# Patient Record
Sex: MALE
[De-identification: names, ages, dates, MRNs, and addresses within clinical notes are randomized per-mention and may not be internally consistent; named-entity substitution may affect disease eponyms.]

---

## 2024-05-22 PROBLEM — Z00.00 ENCOUNTER FOR PREVENTIVE HEALTH EXAMINATION: Status: ACTIVE | Noted: 2024-05-22

## 2024-06-10 ENCOUNTER — APPOINTMENT (OUTPATIENT)
Dept: ORTHOPEDIC SURGERY | Facility: CLINIC | Age: 59
End: 2024-06-10
Payer: COMMERCIAL

## 2024-06-10 ENCOUNTER — TRANSCRIPTION ENCOUNTER (OUTPATIENT)
Age: 59
End: 2024-06-10

## 2024-06-10 VITALS — HEIGHT: 71 IN | WEIGHT: 202 LBS | BODY MASS INDEX: 28.28 KG/M2

## 2024-06-10 DIAGNOSIS — M17.12 UNILATERAL PRIMARY OSTEOARTHRITIS, LEFT KNEE: ICD-10-CM

## 2024-06-10 DIAGNOSIS — Z78.9 OTHER SPECIFIED HEALTH STATUS: ICD-10-CM

## 2024-06-10 PROCEDURE — 99203 OFFICE O/P NEW LOW 30 MIN: CPT

## 2024-06-10 PROCEDURE — 73564 X-RAY EXAM KNEE 4 OR MORE: CPT | Mod: LT

## 2024-06-10 RX ORDER — MELOXICAM 7.5 MG/1
7.5 TABLET ORAL
Qty: 30 | Refills: 0 | Status: ACTIVE | COMMUNITY
Start: 2024-06-10 | End: 1900-01-01

## 2024-06-10 NOTE — IMAGING
[de-identified] : LEFT KNEE  Inspection:  mild effusion  Palpation: medial joint line tenderness, anterior tenderness  Knee Range of Motion:  3-125   Strength: 5/5 Quadriceps strength, 5/5 Hamstring strength  Neurological: light touch is intact throughout  Ligament Stability and Special Tests:   McMurrays: neg  Lachman: neg  Pivot Shift: neg  Posterior Drawer: neg  Valgus: neg  Varus: neg  Patella Apprehension: neg  Patella Maltracking: neg

## 2024-06-10 NOTE — DATA REVIEWED
[FreeTextEntry1] :  Left X-Ray Examination of the KNEE (4 views): mild medial and patellofemoral degenerate changes.   calcific quad and patella tendinopathy

## 2024-06-10 NOTE — HISTORY OF PRESENT ILLNESS
[de-identified] : Date of Injury/Onset: January 2024 Pain: At Rest: 0 With Activity: 3 Mechanism of injury: slipped in the snow  This is NOT a Work Related Injury being treated under Worker's Compensation. This is NOT an athletic injury occurring associated with an interscholastic or organized sports team. Quality of symptoms: swelling, clicking, sharp, tingling  Improves with: ointment Worse with: bending, stretching  Prior treatment: no Prior Imaging: no Reports Available For Review Today: no Out of work/sport: working School/Sport/Position/Occupation: caregiver   06/10/2024 CLARY 59 year M is here today for evaluation of left knee. Patient reports injury on January 2024, slipped in the snow. Patient states pain began lately in May 2024. Patient had been applying ointments for pain relief.Pt complains of pain along posterolateral joint line. Worsened with activities, improved with rest. Patient denies numbness however endorses some swelling, clicking and sharp pain. Patient states pain is worse when bending and stretching his leg.    denies any prior txs or surgery no issues in knee prior to fall

## 2024-06-10 NOTE — DISCUSSION/SUMMARY
[de-identified] :   - We discussed their diagnosis and treatment options at length including the risks and benefits of both surgical treatment with a knee replacement and non-surgical options.  - We will continue conservative treatment with activity modification, PT, icing, weight loss, and anti-inflammatory medications.  - The patient was provided with a PT prescription to work on ROM, hip ER/abductors strengthening, quad/hamstring stretches and strengthening, and other exercises.  - The patient was advised to let pain guide the gradual advancement of activities.  - We discussed the possible of injections in the future.  - Follow up as needed in 6 weeks as to re-evaluate  no tenderness along patella osteophytes  next visit: consider L spine xrays as well

## 2024-08-05 ENCOUNTER — APPOINTMENT (OUTPATIENT)
Dept: ORTHOPEDIC SURGERY | Facility: CLINIC | Age: 59
End: 2024-08-05

## 2024-08-05 PROCEDURE — 99214 OFFICE O/P EST MOD 30 MIN: CPT

## 2024-08-05 NOTE — IMAGING
[de-identified] : LEFT KNEE  Inspection:  mild effusion  Palpation: medial joint line tenderness, anterior tenderness  Knee Range of Motion:  3-125   Strength: 5/5 Quadriceps strength, 5/5 Hamstring strength  Neurological: light touch is intact throughout  Ligament Stability and Special Tests:   McMurrays: neg  Lachman: neg  Pivot Shift: neg  Posterior Drawer: neg  Valgus: neg  Varus: neg  Patella Apprehension: neg  Patella Maltracking: neg

## 2024-08-05 NOTE — DISCUSSION/SUMMARY
[de-identified] :   - We discussed their diagnosis and treatment options at length including the risks and benefits of both surgical treatment with a knee replacement and non-surgical options.  - We will continue conservative treatment with activity modification, PT, icing, weight loss, and anti-inflammatory medications.  - The patient was provided with a PT prescription to work on ROM, hip ER/abductors strengthening, quad/hamstring stretches and strengthening, and other exercises.  - The patient was advised to let pain guide the gradual advancement of activities.  - We discussed the possible of injections in the future.  - Follow up as needed in 6 weeks as to re-evaluate  no tenderness along patella osteophytes  next visit: consider L spine xrays as well *** 8/5 Pt  doing well pain resolved, mild effusion, not limited with activities d/w pt possible MR if symptoms recur to rule out any concomitant pathology prior to discussing injections RTC 3 months  next visit: If no improvement consider MR v  CSI depening on pt symptoms

## 2024-08-05 NOTE — HISTORY OF PRESENT ILLNESS
[de-identified] : Date of Injury/Onset: January 2024 Pain: At Rest: 0 With Activity: 3 Mechanism of injury: slipped in the snow  This is NOT a Work Related Injury being treated under Worker's Compensation. This is NOT an athletic injury occurring associated with an interscholastic or organized sports team. Quality of symptoms: swelling, clicking, sharp, tingling  Improves with: ointment Worse with: bending, stretching  Prior treatment: no Prior Imaging: no Reports Available For Review Today: no Out of work/sport: working School/Sport/Position/Occupation: caregiver   06/10/2024 CLARY Conklin year DANIEL is here today for evaluation of left knee. Patient reports injury on January 2024, slipped in the snow. Patient states pain began lately in May 2024. Patient had been applying ointments for pain relief.Pt complains of pain along posterolateral joint line. Worsened with activities, improved with rest. Patient denies numbness however endorses some swelling, clicking and sharp pain. Patient states pain is worse when bending and stretching his leg.    denies any prior txs or surgery no issues in knee prior to fall  08/05/2024 CLARY chin M is here today to follow up on left knee. Patient is doing PT and had been taken meloxicam.  Festus reports some mild improvement since last visit, reports some swelling, states pain has resolved. Occasionally endorses some mild knee swelling after playing basketball. Happy with progres.

## 2024-09-16 ENCOUNTER — APPOINTMENT (OUTPATIENT)
Dept: ORTHOPEDIC SURGERY | Facility: CLINIC | Age: 59
End: 2024-09-16

## 2024-09-30 ENCOUNTER — APPOINTMENT (OUTPATIENT)
Dept: ORTHOPEDIC SURGERY | Facility: CLINIC | Age: 59
End: 2024-09-30

## 2024-09-30 DIAGNOSIS — S83.232A COMPLEX TEAR OF MEDIAL MENISCUS, CURRENT INJURY, LEFT KNEE, INITIAL ENCOUNTER: ICD-10-CM

## 2024-09-30 DIAGNOSIS — M17.12 UNILATERAL PRIMARY OSTEOARTHRITIS, LEFT KNEE: ICD-10-CM

## 2024-09-30 PROCEDURE — 99213 OFFICE O/P EST LOW 20 MIN: CPT | Mod: 25

## 2024-09-30 PROCEDURE — 20610 DRAIN/INJ JOINT/BURSA W/O US: CPT | Mod: LT

## 2024-09-30 NOTE — DISCUSSION/SUMMARY
[de-identified] :   - We discussed their diagnosis and treatment options at length including the risks and benefits of both surgical treatment with a knee replacement and non-surgical options.  - We will continue conservative treatment with activity modification, PT, icing, weight loss, and anti-inflammatory medications.  - The patient was provided with a PT prescription to work on ROM, hip ER/abductors strengthening, quad/hamstring stretches and strengthening, and other exercises.  - The patient was advised to let pain guide the gradual advancement of activities.  - We discussed the possible of injections in the future.  - Follow up as needed in 6 weeks as to re-evaluate  no tenderness along patella osteophytes  next visit: consider L spine xrays as well *** 8/5 Pt  doing well pain resolved, mild effusion, not limited with activities d/w pt possible MR if symptoms recur to rule out any concomitant pathology prior to discussing injections RTC 3 months  next visit: If no improvement consider MR v  CSI depening on pt symptoms **** 9/30 Pt  doing well still endorsing swelling and tightness MR suggestive of OA and effusion, questionable PHMM tear, baker cyst Rec: Mobic + PT + compression sleeve CSI RTC 3 months  next visit: If no improvement consider HA

## 2024-09-30 NOTE — PROCEDURE
[FreeTextEntry1] : Left knee CSI [FreeTextEntry2] : Left knee pain [FreeTextEntry3] : Procedure: Kenalog injection of the left Knee joint  Indication:  Inflammation and Joint pain.  Risk, benefits and alternatives were discussed with the patient. Potential complications include bleeding and infection.  Alcohol was used to prep the area.  Ethyl chloride spray was used as a topical anesthetic.  Using sterile technique, the aspiration/injection needle was then directed from a lateral and superior aspect.  The procedure was not ultrasound guided.  A 1.5 inch 21g needle was used to inject 3 mL of 1% Lidocaine, 3 mL 0.25% Bupivacaine and 1 mL 40mg/mL triamcinolone.  A bandage was applied.  The patient tolerated the procedure well.  Complications: None.  Patient instructed to avoid strenuous activity for 2 day(s).  Follow-up in the office as needed, in 3 months for repeat injection, if pain remains unresolved, or for further concerns.  Specifically counseled regarding the signs and symptoms of potential intraarticular infection and instructed to present promptly to clinic or hospital if such signs and symptoms arise.

## 2024-09-30 NOTE — DATA REVIEWED
[FreeTextEntry1] :  Left X-Ray Examination of the KNEE (4 views): mild medial and patellofemoral degenerate changes.   calcific quad and patella tendinopathy no

## 2024-09-30 NOTE — HISTORY OF PRESENT ILLNESS
[de-identified] : Date of Injury/Onset: January 2024 Pain: At Rest: 0 With Activity: 3 Mechanism of injury: slipped in the snow  This is NOT a Work Related Injury being treated under Worker's Compensation. This is NOT an athletic injury occurring associated with an interscholastic or organized sports team. 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 Patient reports injury on January 2024, slipped in the snow. Patient states pain began lately in May 2024. Patient had been applying ointments for pain relief.Pt complains of pain along posterolateral joint line. Worsened with activities, improved with rest. Patient denies numbness however endorses some swelling, clicking and sharp pain. Patient states pain is worse when bending and stretching his leg.    denies any prior txs or surgery no issues in knee prior to fall  08/05/2024 CLARY Conklin year M is here today to follow up on left knee. Patient is doing PT and had been taken meloxicam.  Patient reports some mild improvement since last visit, reports some swelling, states pain has resolved. Occasionally endorses some mild knee swelling after playing basketball. Happy with progress.   09/30/2024 CLARY  is here today to follow up on MRI of left knee. Patient reports no pain however endorses some swelling and tightness.

## 2024-09-30 NOTE — IMAGING
[de-identified] : LEFT KNEE  Inspection:  mild effusion  Palpation: medial joint line tenderness, anterior tenderness  Knee Range of Motion:  3-125   Strength: 5/5 Quadriceps strength, 5/5 Hamstring strength  Neurological: light touch is intact throughout  Ligament Stability and Special Tests:   McMurrays: neg  Lachman: neg  Pivot Shift: neg  Posterior Drawer: neg  Valgus: neg  Varus: neg  Patella Apprehension: neg  Patella Maltracking: neg

## 2024-11-25 ENCOUNTER — APPOINTMENT (OUTPATIENT)
Dept: ORTHOPEDIC SURGERY | Facility: CLINIC | Age: 59
End: 2024-11-25

## 2025-01-17 ENCOUNTER — EMERGENCY (EMERGENCY)
Facility: HOSPITAL | Age: 60
LOS: 1 days | Discharge: HOME | End: 2025-01-17
Attending: EMERGENCY MEDICINE
Payer: COMMERCIAL

## 2025-01-17 VITALS
OXYGEN SATURATION: 96 % | SYSTOLIC BLOOD PRESSURE: 116 MMHG | HEART RATE: 59 BPM | DIASTOLIC BLOOD PRESSURE: 74 MMHG | RESPIRATION RATE: 17 BRPM | TEMPERATURE: 97 F

## 2025-01-17 VITALS
HEIGHT: 71 IN | TEMPERATURE: 98 F | WEIGHT: 205.03 LBS | RESPIRATION RATE: 16 BRPM | OXYGEN SATURATION: 96 % | SYSTOLIC BLOOD PRESSURE: 151 MMHG | HEART RATE: 51 BPM | DIASTOLIC BLOOD PRESSURE: 85 MMHG

## 2025-01-17 LAB
ALBUMIN SERPL ELPH-MCNC: 3.9 G/DL — SIGNIFICANT CHANGE UP (ref 3.5–5)
ALP SERPL-CCNC: 44 U/L — SIGNIFICANT CHANGE UP (ref 40–120)
ALT FLD-CCNC: 29 U/L DA — SIGNIFICANT CHANGE UP (ref 10–60)
ANION GAP SERPL CALC-SCNC: 6 MMOL/L — SIGNIFICANT CHANGE UP (ref 5–17)
APPEARANCE UR: CLEAR — SIGNIFICANT CHANGE UP
AST SERPL-CCNC: 13 U/L — SIGNIFICANT CHANGE UP (ref 10–40)
BASOPHILS # BLD AUTO: 0.04 K/UL — SIGNIFICANT CHANGE UP (ref 0–0.2)
BASOPHILS NFR BLD AUTO: 0.7 % — SIGNIFICANT CHANGE UP (ref 0–2)
BILIRUB SERPL-MCNC: 1.3 MG/DL — HIGH (ref 0.2–1.2)
BILIRUB UR-MCNC: NEGATIVE — SIGNIFICANT CHANGE UP
BUN SERPL-MCNC: 15 MG/DL — SIGNIFICANT CHANGE UP (ref 7–18)
CALCIUM SERPL-MCNC: 9.3 MG/DL — SIGNIFICANT CHANGE UP (ref 8.4–10.5)
CHLORIDE SERPL-SCNC: 107 MMOL/L — SIGNIFICANT CHANGE UP (ref 96–108)
CO2 SERPL-SCNC: 29 MMOL/L — SIGNIFICANT CHANGE UP (ref 22–31)
COLOR SPEC: YELLOW — SIGNIFICANT CHANGE UP
CREAT SERPL-MCNC: 0.87 MG/DL — SIGNIFICANT CHANGE UP (ref 0.5–1.3)
DIFF PNL FLD: NEGATIVE — SIGNIFICANT CHANGE UP
EGFR: 101 ML/MIN/1.73M2 — SIGNIFICANT CHANGE UP
EOSINOPHIL # BLD AUTO: 0.09 K/UL — SIGNIFICANT CHANGE UP (ref 0–0.5)
EOSINOPHIL NFR BLD AUTO: 1.7 % — SIGNIFICANT CHANGE UP (ref 0–6)
GLUCOSE SERPL-MCNC: 111 MG/DL — HIGH (ref 70–99)
GLUCOSE UR QL: NEGATIVE MG/DL — SIGNIFICANT CHANGE UP
HCT VFR BLD CALC: 43.4 % — SIGNIFICANT CHANGE UP (ref 39–50)
HGB BLD-MCNC: 14.6 G/DL — SIGNIFICANT CHANGE UP (ref 13–17)
IMM GRANULOCYTES NFR BLD AUTO: 0.2 % — SIGNIFICANT CHANGE UP (ref 0–0.9)
KETONES UR-MCNC: NEGATIVE MG/DL — SIGNIFICANT CHANGE UP
LEUKOCYTE ESTERASE UR-ACNC: NEGATIVE — SIGNIFICANT CHANGE UP
LYMPHOCYTES # BLD AUTO: 1.33 K/UL — SIGNIFICANT CHANGE UP (ref 1–3.3)
LYMPHOCYTES # BLD AUTO: 24.4 % — SIGNIFICANT CHANGE UP (ref 13–44)
MCHC RBC-ENTMCNC: 29.9 PG — SIGNIFICANT CHANGE UP (ref 27–34)
MCHC RBC-ENTMCNC: 33.6 G/DL — SIGNIFICANT CHANGE UP (ref 32–36)
MCV RBC AUTO: 88.8 FL — SIGNIFICANT CHANGE UP (ref 80–100)
MONOCYTES # BLD AUTO: 0.3 K/UL — SIGNIFICANT CHANGE UP (ref 0–0.9)
MONOCYTES NFR BLD AUTO: 5.5 % — SIGNIFICANT CHANGE UP (ref 2–14)
NEUTROPHILS # BLD AUTO: 3.68 K/UL — SIGNIFICANT CHANGE UP (ref 1.8–7.4)
NEUTROPHILS NFR BLD AUTO: 67.5 % — SIGNIFICANT CHANGE UP (ref 43–77)
NITRITE UR-MCNC: NEGATIVE — SIGNIFICANT CHANGE UP
NRBC # BLD: 0 /100 WBCS — SIGNIFICANT CHANGE UP (ref 0–0)
PH UR: 7.5 — SIGNIFICANT CHANGE UP (ref 5–8)
PLATELET # BLD AUTO: 249 K/UL — SIGNIFICANT CHANGE UP (ref 150–400)
POTASSIUM SERPL-MCNC: 4 MMOL/L — SIGNIFICANT CHANGE UP (ref 3.5–5.3)
POTASSIUM SERPL-SCNC: 4 MMOL/L — SIGNIFICANT CHANGE UP (ref 3.5–5.3)
PROT SERPL-MCNC: 7.4 G/DL — SIGNIFICANT CHANGE UP (ref 6–8.3)
PROT UR-MCNC: NEGATIVE MG/DL — SIGNIFICANT CHANGE UP
RBC # BLD: 4.89 M/UL — SIGNIFICANT CHANGE UP (ref 4.2–5.8)
RBC # FLD: 12.6 % — SIGNIFICANT CHANGE UP (ref 10.3–14.5)
SODIUM SERPL-SCNC: 142 MMOL/L — SIGNIFICANT CHANGE UP (ref 135–145)
SP GR SPEC: 1.01 — SIGNIFICANT CHANGE UP (ref 1–1.03)
TROPONIN I, HIGH SENSITIVITY RESULT: 11.2 NG/L — SIGNIFICANT CHANGE UP
UROBILINOGEN FLD QL: 0.2 MG/DL — SIGNIFICANT CHANGE UP (ref 0.2–1)
WBC # BLD: 5.45 K/UL — SIGNIFICANT CHANGE UP (ref 3.8–10.5)
WBC # FLD AUTO: 5.45 K/UL — SIGNIFICANT CHANGE UP (ref 3.8–10.5)

## 2025-01-17 PROCEDURE — 36415 COLL VENOUS BLD VENIPUNCTURE: CPT

## 2025-01-17 PROCEDURE — 70496 CT ANGIOGRAPHY HEAD: CPT | Mod: 26

## 2025-01-17 PROCEDURE — 70498 CT ANGIOGRAPHY NECK: CPT | Mod: 26

## 2025-01-17 PROCEDURE — 70450 CT HEAD/BRAIN W/O DYE: CPT | Mod: 26

## 2025-01-17 PROCEDURE — 70450 CT HEAD/BRAIN W/O DYE: CPT | Mod: MC

## 2025-01-17 PROCEDURE — 84484 ASSAY OF TROPONIN QUANT: CPT

## 2025-01-17 PROCEDURE — 71045 X-RAY EXAM CHEST 1 VIEW: CPT

## 2025-01-17 PROCEDURE — 80053 COMPREHEN METABOLIC PANEL: CPT

## 2025-01-17 PROCEDURE — 81003 URINALYSIS AUTO W/O SCOPE: CPT

## 2025-01-17 PROCEDURE — 82962 GLUCOSE BLOOD TEST: CPT

## 2025-01-17 PROCEDURE — 70496 CT ANGIOGRAPHY HEAD: CPT | Mod: MC

## 2025-01-17 PROCEDURE — 70498 CT ANGIOGRAPHY NECK: CPT | Mod: MC

## 2025-01-17 PROCEDURE — 99285 EMERGENCY DEPT VISIT HI MDM: CPT

## 2025-01-17 PROCEDURE — 93005 ELECTROCARDIOGRAM TRACING: CPT

## 2025-01-17 PROCEDURE — 71045 X-RAY EXAM CHEST 1 VIEW: CPT | Mod: 26

## 2025-01-17 PROCEDURE — 96374 THER/PROPH/DIAG INJ IV PUSH: CPT | Mod: XU

## 2025-01-17 PROCEDURE — 99285 EMERGENCY DEPT VISIT HI MDM: CPT | Mod: 25

## 2025-01-17 PROCEDURE — 85025 COMPLETE CBC W/AUTO DIFF WBC: CPT

## 2025-01-17 RX ORDER — LORAZEPAM 1 MG/1
1 TABLET ORAL ONCE
Refills: 0 | Status: DISCONTINUED | OUTPATIENT
Start: 2025-01-17 | End: 2025-01-17

## 2025-01-17 RX ORDER — ONDANSETRON 4 MG/1
1 TABLET ORAL
Qty: 1 | Refills: 0
Start: 2025-01-17 | End: 2025-01-21

## 2025-01-17 RX ORDER — MECLIZINE HYDROCHLORIDE 25 MG/1
1 TABLET ORAL
Qty: 5 | Refills: 0
Start: 2025-01-17 | End: 2025-01-21

## 2025-01-17 RX ORDER — SODIUM CHLORIDE 9 MG/ML
1000 INJECTION, SOLUTION INTRAMUSCULAR; INTRAVENOUS; SUBCUTANEOUS ONCE
Refills: 0 | Status: COMPLETED | OUTPATIENT
Start: 2025-01-17 | End: 2025-01-17

## 2025-01-17 RX ORDER — ONDANSETRON 4 MG/1
4 TABLET ORAL ONCE
Refills: 0 | Status: COMPLETED | OUTPATIENT
Start: 2025-01-17 | End: 2025-01-17

## 2025-01-17 RX ORDER — MECLIZINE HYDROCHLORIDE 25 MG/1
25 TABLET ORAL ONCE
Refills: 0 | Status: COMPLETED | OUTPATIENT
Start: 2025-01-17 | End: 2025-01-17

## 2025-01-17 RX ORDER — LORAZEPAM 1 MG/1
1 TABLET ORAL
Qty: 6 | Refills: 0
Start: 2025-01-17 | End: 2025-01-18

## 2025-01-17 RX ADMIN — ONDANSETRON 4 MILLIGRAM(S): 4 TABLET ORAL at 10:28

## 2025-01-17 RX ADMIN — LORAZEPAM 1 MILLIGRAM(S): 1 TABLET ORAL at 15:00

## 2025-01-17 RX ADMIN — SODIUM CHLORIDE 1000 MILLILITER(S): 9 INJECTION, SOLUTION INTRAMUSCULAR; INTRAVENOUS; SUBCUTANEOUS at 10:28

## 2025-01-17 RX ADMIN — MECLIZINE HYDROCHLORIDE 25 MILLIGRAM(S): 25 TABLET ORAL at 10:29

## 2025-01-17 NOTE — ED PROVIDER NOTE - DIFFERENTIAL DIAGNOSIS
Differential Diagnosis Assessment/plan: Dizziness resolves with eyes closed and sitting still concerns include but not limited to peripheral versus central causes - likely BPV given hx and exam.

## 2025-01-17 NOTE — ED PROVIDER NOTE - NS ED ROS FT
Constitutional: no fever no chills  Eyes: no conjunctivitis  Ears: no ear pain no tinnitus  Nose: no nasal congestion, Mouth/Throat: no throat pain, Neck: no stiffness  Cardiovascular: no chest pain  Respiratory: no shortness of breath no cough  Gastrointestinal: no abdominal pain, no vomiting no diarrhea  MSK: no joint pain  : no  dysuria  Skin: no rash  Neuro: no LOC no seizures, see hpi

## 2025-01-17 NOTE — ED PROVIDER NOTE - CLINICAL SUMMARY MEDICAL DECISION MAKING FREE TEXT BOX
ATTG: : Assessment/plan: Dizziness resolves with eyes closed and sitting still concerns include but not limited to peripheral versus central, given he has had similar episodes in the past will check labs trial of meclizine and Zofran and reeval for dispo.

## 2025-01-17 NOTE — ED PROVIDER NOTE - PATIENT PORTAL LINK FT
You can access the FollowMyHealth Patient Portal offered by [facility] by registering at the following website: [email or web address]. By joining [facility] [facility]’s FollowMyHealth portal, you will also be able to view your health information using other applications (apps) compatible with our system. You can access the FollowMyHealth Patient Portal offered by [facility] by registering at the following website: [email or web address]. By joining [employer] [facility]’s FollowMyHealth portal, you will also be able to view your health information using other applications (apps) compatible with our system.

## 2025-01-17 NOTE — ED ADULT NURSE NOTE - NSFALLRISKFACTORS_ED_ALL_ED
implant on november./Surgery: Recent surgery, recent lower limb amputation, major abdominal or thoracic surgery

## 2025-01-17 NOTE — ED ADULT NURSE NOTE - NSFALLHARMRISKINTERV_ED_ALL_ED
Assistance OOB with selected safe patient handling equipment if applicable/Assistance with ambulation/Communicate risk of Fall with Harm to all staff, patient, and family/Encourage patient to sit up slowly, dangle for a short time, stand at bedside before walking/Monitor gait and stability/Orthostatic vital signs/Provide visual cue: red socks, yellow wristband, yellow gown, etc/Reinforce activity limits and safety measures with patient and family/Bed in lowest position, wheels locked, appropriate side rails in place/Call bell, personal items and telephone in reach/Instruct patient to call for assistance before getting out of bed/chair/stretcher/Non-slip footwear applied when patient is off stretcher/[place] to call system/Physically safe environment - no spills, clutter or unnecessary equipment/Purposeful Proactive Rounding/Room/bathroom lighting operational, light cord in reach

## 2025-01-17 NOTE — ED PROVIDER NOTE - PHYSICAL EXAMINATION
Gen.  No acute respiratory distress  HEENT: Pupils are 3 mm and reactive to light bilaterally, extraocular muscles are intact, has a fatigable nystagmus to the left.  No facial symmetry, sensation intact both sides.  Lungs:  b/l BS, no crackles no wheezing no rhonchi  CVS: S1S2   Abd: soft, no tenderness, no distention, no guarding  Ext: no edema, no erythema  Neuro: Awake, alert, oriented x 3, no focal deficits, normal finger-to-nose, normal heel-to-shin, no pronator drift.  MSK: strength normal in upper and lower ext

## 2025-01-17 NOTE — ED PROVIDER NOTE - OBJECTIVE STATEMENT
59-year-old male with PMHx of HTN, presents emergency department with his wife for evaluation of dizziness.  Started on Wednesday and seem to have improved on Thursday but then again had an episode this morning.  Worse with movement but complete resolves at rest.  There is no associated headache.  There is no weakness or numbness.  There is no incontinence of bowel or bladder.  No recent trauma.  Took over-the-counter Dramamine with minimal relief.  Has nausea without vomiting.  No associate fever chills.  No chest pain or back pain.  No abdominal pain.  No diarrhea.  Had a similar episode approximately 11 years ago

## 2025-01-17 NOTE — ED PROVIDER NOTE - NSFOLLOWUPINSTRUCTIONS_ED_ALL_ED_FT
Your diagnosis this visit was: Dizziness    From this ED visit you were prescribed: Meclizine 25 mg.  Take as directed.    Please continue taking your prescribed medications as directed  Continue taking over the counter medications such as Acetaminophen and/or Ibuprofen as directed for pain.     You may be contacted by our Emergency Department Referrals Coordinator to set up your follow-up appointment within 24-48 hours of your discharge, Monday through Friday.   We recommend you follow up with: your medical doctor   Recommend a follow-up with a neurologist.  Please call the number listed above to schedule an appointment.     Please return to the Emergency Department if you experience any of the following symptoms:  -Persistent dizziness and vomiting  -Headache  -Any new weakness or numbness  -Any incontinence  -Any high fever or neck pain  - any worsening of symptoms

## 2025-01-17 NOTE — ED PROVIDER NOTE - WET READ LAUNCH FT
There are no Wet Read(s) to document.
Alert and oriented, no focal deficits, no motor or sensory deficits.

## 2025-01-17 NOTE — ED ADULT NURSE NOTE - OBJECTIVE STATEMENT
Patient is c/o worsening dizziness and nausea for 2 days. Patient is S/P cochlear  implant on november 2024. Patient reports these symptoms started one month after the initial surgery. Patient denies any vomiting, No diarrhea, No chest pain, denies SOB. Patient is placed on bed with bed alarm,  yellow wrist bend and gown, Red socks provided and patient denies to take his shoes off at this time. Patient and wife is educated on fall prevention and possible risks of fall related injuries. Patient and wife verbalized understanding.

## 2025-01-17 NOTE — ED PROVIDER NOTE - PROGRESS NOTE DETAILS
Received signout on patient from Dr. Davalos.  Initial noncontrast CT scan had concern for possible basilar artery abnormality.  Follow-up CT angiogram did not show any evidence of a vascular abnormality at this time.  Patient is now ambulatory under her own power without assistance and is reporting feeling improved.  Has follow-up with an ENT who placed his cochlear implant and PMD.  Will prescribe meclizine Ativan and Zofran as needed for symptoms.  Discussed return precautions to the emergency department which patient and family were comfortable with.  Will discharge

## 2025-01-18 RX ORDER — MECLIZINE HYDROCHLORIDE 25 MG/1
1 TABLET ORAL
Qty: 5 | Refills: 0
Start: 2025-01-18 | End: 2025-01-22

## 2025-01-18 RX ORDER — ONDANSETRON 4 MG/1
1 TABLET ORAL
Qty: 1 | Refills: 0
Start: 2025-01-18
